# Patient Record
Sex: FEMALE | Race: WHITE | Employment: FULL TIME | URBAN - NONMETROPOLITAN AREA
[De-identification: names, ages, dates, MRNs, and addresses within clinical notes are randomized per-mention and may not be internally consistent; named-entity substitution may affect disease eponyms.]

---

## 2018-03-02 ENCOUNTER — HOSPITAL ENCOUNTER (OUTPATIENT)
Dept: WOMENS IMAGING | Age: 53
Discharge: HOME | End: 2018-03-02
Payer: COMMERCIAL

## 2018-03-02 ENCOUNTER — APPOINTMENT (OUTPATIENT)
Dept: WOMENS IMAGING | Age: 53
End: 2018-03-02
Payer: COMMERCIAL

## 2018-03-02 DIAGNOSIS — N63.0 BREAST MASS: ICD-10-CM

## 2018-03-02 PROCEDURE — 77066 DX MAMMO INCL CAD BI: CPT

## 2018-04-06 ENCOUNTER — HOSPITAL ENCOUNTER (EMERGENCY)
Age: 53
Discharge: HOME | End: 2018-04-06
Payer: COMMERCIAL

## 2018-04-06 VITALS
WEIGHT: 160.4 LBS | HEART RATE: 68 BPM | DIASTOLIC BLOOD PRESSURE: 91 MMHG | TEMPERATURE: 98.6 F | RESPIRATION RATE: 16 BRPM | OXYGEN SATURATION: 97 % | SYSTOLIC BLOOD PRESSURE: 144 MMHG | HEIGHT: 67 IN | BODY MASS INDEX: 25.18 KG/M2

## 2018-04-06 DIAGNOSIS — H10.32 ACUTE BACTERIAL CONJUNCTIVITIS OF LEFT EYE: Primary | ICD-10-CM

## 2018-04-06 PROCEDURE — 99214 OFFICE O/P EST MOD 30 MIN: CPT | Performed by: NURSE PRACTITIONER

## 2018-04-06 PROCEDURE — 99213 OFFICE O/P EST LOW 20 MIN: CPT

## 2018-04-06 RX ORDER — POLYMYXIN B SULFATE AND TRIMETHOPRIM 1; 10000 MG/ML; [USP'U]/ML
1 SOLUTION OPHTHALMIC
Qty: 1 BOTTLE | Refills: 0 | Status: SHIPPED | OUTPATIENT
Start: 2018-04-06 | End: 2018-04-13

## 2018-04-06 ASSESSMENT — ENCOUNTER SYMPTOMS
PHOTOPHOBIA: 0
EYE WATERING: 1
EYE PAIN: 0
BLURRED VISION: 1
NAUSEA: 0
VOMITING: 0
EYE INFLAMMATION: 1
RHINORRHEA: 0
VOICE CHANGE: 0
COUGH: 0
FACIAL SWELLING: 0
PERI-ORBITAL EDEMA: 0
SORE THROAT: 0
EYE REDNESS: 1
EYE DISCHARGE: 0
DOUBLE VISION: 0
EYE ITCHING: 0
CRUSTING: 0

## 2020-01-10 ENCOUNTER — HOSPITAL ENCOUNTER (OUTPATIENT)
Dept: MAMMOGRAPHY | Age: 55
Discharge: HOME | End: 2020-01-10
Payer: COMMERCIAL

## 2020-01-10 PROCEDURE — 77063 BREAST TOMOSYNTHESIS BI: CPT

## 2020-09-25 ENCOUNTER — HOSPITAL ENCOUNTER (EMERGENCY)
Age: 55
Discharge: HOME | End: 2020-09-25
Attending: EMERGENCY MEDICINE
Payer: COMMERCIAL

## 2020-09-25 VITALS
HEIGHT: 66 IN | RESPIRATION RATE: 16 BRPM | DIASTOLIC BLOOD PRESSURE: 65 MMHG | BODY MASS INDEX: 24.91 KG/M2 | HEART RATE: 78 BPM | SYSTOLIC BLOOD PRESSURE: 145 MMHG | WEIGHT: 155 LBS | OXYGEN SATURATION: 98 % | TEMPERATURE: 96.7 F

## 2020-09-25 PROCEDURE — 99213 OFFICE O/P EST LOW 20 MIN: CPT | Performed by: EMERGENCY MEDICINE

## 2020-09-25 PROCEDURE — 99212 OFFICE O/P EST SF 10 MIN: CPT

## 2020-09-25 RX ORDER — PREDNISONE 20 MG/1
20 TABLET ORAL DAILY
Qty: 14 TABLET | Refills: 0 | Status: SHIPPED | OUTPATIENT
Start: 2020-09-25 | End: 2020-10-07

## 2020-09-25 RX ORDER — HYDROXYZINE 50 MG/1
50 TABLET, FILM COATED ORAL 4 TIMES DAILY PRN
Qty: 15 TABLET | Refills: 0 | Status: SHIPPED | OUTPATIENT
Start: 2020-09-25

## 2020-09-25 ASSESSMENT — ENCOUNTER SYMPTOMS
FACIAL SWELLING: 0
BACK PAIN: 0
SORE THROAT: 0
CHOKING: 0
EYE PAIN: 0
VOMITING: 0
BLOOD IN STOOL: 0
DIARRHEA: 0
TROUBLE SWALLOWING: 0
WHEEZING: 0
STRIDOR: 0
ABDOMINAL PAIN: 0
SINUS PRESSURE: 0
COUGH: 0
NAUSEA: 0
CONSTIPATION: 0
EYE REDNESS: 0
EYE DISCHARGE: 0
SHORTNESS OF BREATH: 0
VOICE CHANGE: 0

## 2020-09-25 NOTE — ED PROVIDER NOTES
[phone]       Chief Complaint   Patient presents with    Rash       Nurses Notes reviewed and I agree except as noted in the HPI. HISTORY OF PRESENT ILLNESS   Mariebl Borja is a 49 y.o. female who presents with persistent itchy rash of one-week on both lower extremities after plant allergen exposure in [place]. Patient has completed Medrol Dosepak without improvement. No fever, vomiting, chest pain, shortness of breath, wheezing, dizziness, syncope. No purulent discharge. No painful or swollen glands. No painful areas of rash. REVIEW OF SYSTEMS     Review of Systems   Constitutional: Negative for appetite change, chills, fatigue, fever and unexpected weight change. HENT: Negative for congestion, ear discharge, ear pain, facial swelling, hearing loss, nosebleeds, postnasal drip, sinus pressure, sore throat, trouble swallowing and voice change. Eyes: Negative for pain, discharge, redness and visual disturbance. Respiratory: Negative for cough, choking, shortness of breath, wheezing and stridor. Cardiovascular: Negative for chest pain and leg swelling. Gastrointestinal: Negative for abdominal pain, blood in stool, constipation, diarrhea, nausea and vomiting. Genitourinary: Negative for dysuria, flank pain, frequency, hematuria, urgency, vaginal bleeding and vaginal discharge. Musculoskeletal: Negative for arthralgias, back pain, neck pain and neck stiffness. Skin: Positive for rash. Itchy rash both lower extremities after plant allergen exposure   Neurological: Negative for dizziness, seizures, syncope, weakness, light-headedness and headaches. Hematological: Negative for adenopathy. Does not bruise/bleed easily. Psychiatric/Behavioral: Negative for confusion, sleep disturbance and suicidal ideas. The patient is not nervous/anxious. All other systems reviewed and are negative.       PAST MEDICAL HISTORY         Diagnosis Date    Hypertension        SURGICAL HISTORY     Patient  has a past surgical history that includes  section and Appendectomy. CURRENT MEDICATIONS       Discharge Medication List as of 2020  6:55 PM      CONTINUE these medications which have NOT CHANGED    Details   cyclobenzaprine (FLEXERIL) 10 MG tablet Take 10 mg by mouth nightly as needed for Muscle spasms      docusate sodium (COLACE) 100 MG capsule Take 100 mg by mouth daily [facility] brand      ibuprofen (ADVIL;MOTRIN) 200 MG tablet Take 200 mg by mouth every 6 hours as needed for Pain (for headache)      vitamin D (CHOLECALCIFEROL) 1000 UNIT TABS tablet Take 1,000 Units by mouth daily      acetaminophen 650 MG TABS Take 650 mg by mouth every 4 hours as needed, Disp-120 tablet, R-3      aspirin 81 MG chewable tablet Take 1 tablet by mouth daily, Disp-30 tablet, R-3      omeprazole (PRILOSEC) 10 MG capsule Take 10 mg by mouth daily. Multiple Vitamins-Minerals (THERAPEUTIC MULTIVITAMIN-MINERALS) tablet Take 1 tablet by mouth daily. ALLERGIES     Patient is has No Known Allergies. FAMILY HISTORY     Patient'sfamily history includes Diabetes in her father; Heart Disease in her father and mother; High Blood Pressure in her father and mother. SOCIAL HISTORY     Patient  reports that she has never smoked. She has never used smokeless tobacco. She reports that she does not drink alcohol or use drugs. PHYSICAL EXAM     ED TRIAGE VITALS  BP: (!) 145/65, Temp: 96.7 °F (35.9 °C), Pulse: 78, Resp: 16, SpO2: 98 %  Physical Exam  Vitals signs and nursing note reviewed. Constitutional:       General: She is not in acute distress. Appearance: She is well-developed. She is not ill-appearing. Comments: Moist membranes, normal airway   HENT:      Head: Normocephalic and atraumatic.       Right Ear: Tympanic membrane and external ear normal.      Left Ear: Tympanic membrane and external ear normal. Nose: Nose normal. No congestion or rhinorrhea. Comments: Oropharynx normal     Mouth/Throat:      Pharynx: No oropharyngeal exudate. Eyes:      General: No scleral icterus. Right eye: No discharge. Left eye: No discharge. Extraocular Movements:      Right eye: Normal extraocular motion. Left eye: Normal extraocular motion. Conjunctiva/sclera: Conjunctivae normal.      Pupils: Pupils are equal, round, and reactive to light. Comments: Conjunctiva clear   Neck:      Musculoskeletal: Normal range of motion. Thyroid: No thyromegaly. Vascular: No JVD. Comments: No meningismus  Cardiovascular:      Rate and Rhythm: Normal rate and regular rhythm. Pulses: Normal pulses. Heart sounds: Normal heart sounds, S1 normal and S2 normal. No murmur. No friction rub. No gallop. Pulmonary:      Effort: Pulmonary effort is normal. No tachypnea or respiratory distress. Breath sounds: Normal breath sounds. No stridor. No decreased breath sounds, wheezing, rhonchi or rales. Comments: No cough, lungs clear  Chest:      Chest wall: No tenderness. Abdominal:      General: Bowel sounds are normal. There is no distension. Palpations: Abdomen is soft. There is no mass. Tenderness: There is no abdominal tenderness. There is no right CVA tenderness, left CVA tenderness, guarding or rebound. Comments: Soft nontender   Musculoskeletal: Normal range of motion. General: No tenderness. Right lower leg: Normal.      Left lower leg: Normal.   Lymphadenopathy:      Cervical: No cervical adenopathy. Right cervical: No superficial cervical adenopathy. Left cervical: No superficial cervical adenopathy. Skin:     General: Skin is warm and dry. Findings: No erythema or rash. Comments: Allergic contact dermatitis both lower legs. No bacterial infection. No infestation.    Neurological:      Mental Status: She is alert and oriented to person, place, and time. Cranial Nerves: No cranial nerve deficit. Motor: No abnormal muscle tone. Coordination: Coordination normal.      Deep Tendon Reflexes: Reflexes are normal and symmetric. Reflexes normal.      Comments: Appropriate, no focal finding   Psychiatric:         Behavior: Behavior normal.         Thought Content: Thought content normal.         Judgment: Judgment normal.         DIAGNOSTIC RESULTS   Labs: No results found for this visit on 09/25/20. IMAGING:  No orders to display     URGENT CARE COURSE:     Vitals:    09/25/20 1821   BP: (!) 145/65   Pulse: 78   Resp: 16   Temp: 96.7 °F (35.9 °C)   TempSrc: Infrared   SpO2: 98%   Weight: 155 lb (70.3 kg)   Height: 5' 6\" (1.676 m)       Medications - No data to display  PROCEDURES:  None  FINALIMPRESSION      1. Allergic contact dermatitis due to plant        DISPOSITION/PLAN   DISPOSITION    Nontoxic, well-hydrated, normal airway. No anaphylactic or anaphylactoid reaction, angioedema, urticaria, SJS, TEN. Patient has allergic contact dermatitis. No infestation. No bacterial infection. Will require systemic and topical corticosteroids and antihistamines. Will treat with prednisone, Valisone, Atarax, increased oral clear liquids, rest in cool air conditioned space.   Patient to recheck with PCP in 7 days if problems persist, and she understands to go to ED if worse  PATIENT REFERRED TO:  DO Filomena Hall [facility] [address]  [address] [place] [address]    Schedule an appointment as soon as possible for a visit in 7 days  Recheck if problems persist, go to emergency if worse    DISCHARGE MEDICATIONS:  Discharge Medication List as of 9/25/2020  6:55 PM      START taking these medications    Details   predniSONE (DELTASONE) 20 MG tablet Take 1 tablet by mouth daily for 12 days 2 p.o. daily for 4 days, 1 p.o. daily for 4 days, one half p.o. daily for 4 days, Disp-14 tablet,R-0Print      betamethasone valerate (VALISONE) 0.1 % cream Apply topically 3 times daily.   Do not apply to face, Disp-45 g,R-0, Print      hydrOXYzine (ATARAX) 50 MG tablet Take 1 tablet by mouth 4 times daily as needed for Itching (rash swelling) Caution will cause drowsiness do not take at work, Disp-15 tablet,R-0Print           Discharge Medication List as of 9/25/2020  6:55 PM          MD Mason Bolton MD  09/25/20 15 Merry Graham MD  09/25/20 4947

## 2021-01-15 ENCOUNTER — HOSPITAL ENCOUNTER (OUTPATIENT)
Dept: MAMMOGRAPHY | Age: 56
Discharge: HOME | End: 2021-01-15
Payer: COMMERCIAL

## 2021-01-15 DIAGNOSIS — Z12.31 ENCOUNTER FOR SCREENING MAMMOGRAM FOR MALIGNANT NEOPLASM OF BREAST: ICD-10-CM

## 2021-01-15 PROCEDURE — 77063 BREAST TOMOSYNTHESIS BI: CPT

## 2021-09-24 ENCOUNTER — HOSPITAL ENCOUNTER (EMERGENCY)
Age: 56
Discharge: HOME | End: 2021-09-24
Payer: COMMERCIAL

## 2021-09-24 VITALS
SYSTOLIC BLOOD PRESSURE: 151 MMHG | OXYGEN SATURATION: 98 % | WEIGHT: 150 LBS | BODY MASS INDEX: 23.54 KG/M2 | RESPIRATION RATE: 16 BRPM | TEMPERATURE: 99 F | HEART RATE: 96 BPM | HEIGHT: 67 IN | DIASTOLIC BLOOD PRESSURE: 86 MMHG

## 2021-09-24 DIAGNOSIS — J02.9 ACUTE PHARYNGITIS, UNSPECIFIED ETIOLOGY: ICD-10-CM

## 2021-09-24 DIAGNOSIS — H65.01 RIGHT ACUTE SEROUS OTITIS MEDIA, RECURRENCE NOT SPECIFIED: Primary | ICD-10-CM

## 2021-09-24 PROCEDURE — 99213 OFFICE O/P EST LOW 20 MIN: CPT

## 2021-09-24 PROCEDURE — 99213 OFFICE O/P EST LOW 20 MIN: CPT | Performed by: EMERGENCY MEDICINE

## 2021-09-24 RX ORDER — AMOXICILLIN 875 MG/1
875 TABLET, COATED ORAL 2 TIMES DAILY
Qty: 20 TABLET | Refills: 0 | Status: SHIPPED | OUTPATIENT
Start: 2021-09-24 | End: 2021-10-04

## 2021-09-24 ASSESSMENT — PAIN DESCRIPTION - LOCATION: LOCATION: EAR;THROAT

## 2021-09-24 ASSESSMENT — PAIN DESCRIPTION - FREQUENCY: FREQUENCY: CONTINUOUS

## 2021-09-24 ASSESSMENT — ENCOUNTER SYMPTOMS
RHINORRHEA: 0
SINUS PAIN: 0
SORE THROAT: 1
SHORTNESS OF BREATH: 0
TROUBLE SWALLOWING: 1
COUGH: 0

## 2021-09-24 ASSESSMENT — PAIN DESCRIPTION - PAIN TYPE: TYPE: ACUTE PAIN

## 2021-09-24 ASSESSMENT — PAIN SCALES - GENERAL: PAINLEVEL_OUTOF10: 6

## 2021-09-24 ASSESSMENT — PAIN DESCRIPTION - DESCRIPTORS: DESCRIPTORS: ACHING;SORE

## 2021-09-24 NOTE — ED TRIAGE NOTES
Pt ambulatory into esuc with c/o bilateral ear pain, headache and sore throat for the past two days. Pt states pain 6 and is hard to swallow.

## 2021-09-24 NOTE — ED PROVIDER NOTES
[facility] 36  Urgent Care Encounter       CHIEF COMPLAINT       Chief Complaint   Patient presents with    Pharyngitis    Headache    Otalgia       Nurses Notes reviewed and I agree except as noted in the HPI. HISTORY OF PRESENT ILLNESS   Stella Rodriguez is a 48 y.o. female who presents for complaints of bilateral ear pain, sore throat. Symptoms x3 days. Patient states the ears started first.  Then she developed a sore throat. Now she has swollen glands. No known fever but she has not taken her temperature. Currently she rates her pain 6 on a 10 scale. No cough. No shortness of breath. No loss of sensation of taste or smell. No sinus congestion or rhinorrhea. HPI    REVIEW OF SYSTEMS     Review of Systems   Constitutional: Negative for diaphoresis, fatigue and fever. HENT: Positive for ear pain, sore throat and trouble swallowing (pain with swallowing but no difficulty swallowing). Negative for congestion, ear discharge, rhinorrhea and sinus pain. Respiratory: Negative for cough and shortness of breath. Cardiovascular: Negative for chest pain. Neurological: Negative for dizziness, light-headedness and headaches. PAST MEDICAL HISTORY         Diagnosis Date    Hypertension        SURGICALHISTORY     Patient  has a past surgical history that includes  section and Appendectomy. CURRENT MEDICATIONS       Discharge Medication List as of 2021  3:17 PM      CONTINUE these medications which have NOT CHANGED    Details   docusate sodium (COLACE) 100 MG capsule Take 100 mg by mouth daily [facility] brand      vitamin D (CHOLECALCIFEROL) 1000 UNIT TABS tablet Take 1,000 Units by mouth daily      acetaminophen 650 MG TABS Take 650 mg by mouth every 4 hours as needed, Disp-120 tablet, R-3      aspirin 81 MG chewable tablet Take 1 tablet by mouth daily, Disp-30 tablet, R-3      omeprazole (PRILOSEC) 10 MG capsule Take 10 mg by mouth daily. Multiple Vitamins-Minerals (THERAPEUTIC MULTIVITAMIN-MINERALS) tablet Take 1 tablet by mouth daily. hydrOXYzine (ATARAX) 50 MG tablet Take 1 tablet by mouth 4 times daily as needed for Itching (rash swelling) Caution will cause drowsiness do not take at work, Disp-15 tablet,R-0Print      cyclobenzaprine (FLEXERIL) 10 MG tablet Take 10 mg by mouth nightly as needed for Muscle spasms      ibuprofen (ADVIL;MOTRIN) 200 MG tablet Take 200 mg by mouth every 6 hours as needed for Pain (for headache)             ALLERGIES     Patient is has No Known Allergies. Patients   There is no immunization history on file for this patient. FAMILY HISTORY     Patient's family history includes Diabetes in her father; Heart Disease in her father and mother; High Blood Pressure in her father and mother. SOCIAL HISTORY     Patient  reports that she has never smoked. She has never used smokeless tobacco. She reports that she does not drink alcohol and does not use drugs. PHYSICAL EXAM     ED TRIAGE VITALS  BP: (!) 151/86, Temp: 99 °F (37.2 °C), Pulse: 96, Resp: 16, SpO2: 98 %,Estimated body mass index is 23.85 kg/m² as calculated from the following:    Height as of this encounter: 5' 6.5\" (1.689 m). Weight as of this encounter: 150 lb (68 kg). ,Patient's last menstrual period was 03/04/2018. Physical Exam  Vitals reviewed. Constitutional:       Appearance: She is well-developed and normal weight. HENT:      Head: Normocephalic. Right Ear: Swelling and tenderness present. No drainage. A middle ear effusion is present. Tympanic membrane is erythematous. Left Ear: Swelling present. No drainage or tenderness. No middle ear effusion. Tympanic membrane is not erythematous. Nose: No congestion or rhinorrhea. Mouth/Throat:      Mouth: Mucous membranes are moist.      Pharynx: Posterior oropharyngeal erythema present. No pharyngeal swelling, oropharyngeal exudate or uvula swelling.       Tonsils: No tonsillar exudate or tonsillar abscesses. Cardiovascular:      Rate and Rhythm: Normal rate. Heart sounds: Normal heart sounds. Abdominal:      General: Bowel sounds are normal.      Palpations: Abdomen is soft. Lymphadenopathy:      Cervical: Cervical adenopathy present. Skin:     General: Skin is warm and dry. Capillary Refill: Capillary refill takes less than 2 seconds. Neurological:      General: No focal deficit present. Mental Status: She is alert. Psychiatric:         Mood and Affect: Mood normal.         DIAGNOSTIC RESULTS     Labs:No results found for this visit on 09/24/21. IMAGING:    No orders to display         EKG:      URGENT CARE COURSE:     Vitals:    09/24/21 1504   BP: (!) 151/86   Pulse: 96   Resp: 16   Temp: 99 °F (37.2 °C)   TempSrc: Temporal   SpO2: 98%   Weight: 150 lb (68 kg)   Height: 5' 6.5\" (1.689 m)       Medications - No data to display         PROCEDURES:  None    FINAL IMPRESSION      1. Right acute serous otitis media, recurrence not specified    2. Acute pharyngitis, unspecified etiology          DISPOSITION/ PLAN     Patient presents for what is likely right otitis media. Patient also has sore throat, possible strep. I elected not to test for strep as there is shortage on strep test availability. Will cover OM with amoxicillin which will also cover strep. Patient will be discharged. Advise drink plenty fluids. Chloraseptic spray or lozenges for sore throat. Tylenol or Motrin for ear pain. Return for new or worsening symptoms. Patient verbalized understanding of all instructions.       PATIENT REFERRED TO:  DO Filomena Sanchez [facility] [address] / [place] [facility] [id number]      DISCHARGE MEDICATIONS:  Discharge Medication List as of 9/24/2021  3:17 PM      START taking these medications    Details   amoxicillin (AMOXIL) 875 MG tablet Take 1 tablet by mouth 2 times daily for 10 days, Disp-20 tablet, R-0Normal             Discharge Medication List as of 9/24/2021  3:17 PM          Discharge Medication List as of 9/24/2021  3:17 PM          PRESTON Mike CNP    (Please note that portions of this note were completed with a voice recognition program. Efforts were made to edit the dictations but occasionally words are mis-transcribed.)          PRESTON Mike CNP  09/24/21 1521

## 2022-01-14 ENCOUNTER — NURSE ONLY (OUTPATIENT)
Dept: LAB | Age: 57
End: 2022-01-14

## 2022-01-14 ENCOUNTER — HOSPITAL ENCOUNTER (OUTPATIENT)
Dept: WOMENS IMAGING | Age: 57
Discharge: HOME | End: 2022-01-14
Payer: COMMERCIAL

## 2022-01-14 DIAGNOSIS — Z12.31 VISIT FOR SCREENING MAMMOGRAM: ICD-10-CM

## 2022-01-14 PROCEDURE — 77063 BREAST TOMOSYNTHESIS BI: CPT

## 2022-01-25 LAB — CYTOLOGY THIN PREP PAP: NORMAL

## 2022-05-30 ENCOUNTER — HOSPITAL ENCOUNTER (EMERGENCY)
Age: 57
Discharge: HOME | End: 2022-05-30
Payer: COMMERCIAL

## 2022-05-30 VITALS
DIASTOLIC BLOOD PRESSURE: 84 MMHG | BODY MASS INDEX: 27 KG/M2 | WEIGHT: 168 LBS | HEART RATE: 85 BPM | RESPIRATION RATE: 16 BRPM | SYSTOLIC BLOOD PRESSURE: 138 MMHG | TEMPERATURE: 99.5 F | OXYGEN SATURATION: 98 % | HEIGHT: 66 IN

## 2022-05-30 DIAGNOSIS — J06.9 VIRAL URI: ICD-10-CM

## 2022-05-30 DIAGNOSIS — H65.01 NON-RECURRENT ACUTE SEROUS OTITIS MEDIA OF RIGHT EAR: Primary | ICD-10-CM

## 2022-05-30 PROCEDURE — 99213 OFFICE O/P EST LOW 20 MIN: CPT

## 2022-05-30 PROCEDURE — 99213 OFFICE O/P EST LOW 20 MIN: CPT | Performed by: NURSE PRACTITIONER

## 2022-05-30 RX ORDER — AMOXICILLIN AND CLAVULANATE POTASSIUM 875; 125 MG/1; MG/1
1 TABLET, FILM COATED ORAL 2 TIMES DAILY
Qty: 20 TABLET | Refills: 0 | Status: SHIPPED | OUTPATIENT
Start: 2022-05-30 | End: 2022-06-09

## 2022-05-30 RX ORDER — AMLODIPINE BESYLATE 5 MG/1
5 TABLET ORAL DAILY
COMMUNITY

## 2022-05-30 ASSESSMENT — ENCOUNTER SYMPTOMS
EYE DISCHARGE: 0
COUGH: 1
SORE THROAT: 0
TROUBLE SWALLOWING: 0
NAUSEA: 0
SINUS PRESSURE: 1
DIARRHEA: 0
EYE REDNESS: 0
VOMITING: 0
CHEST TIGHTNESS: 0
FACIAL SWELLING: 0
SHORTNESS OF BREATH: 0
RHINORRHEA: 1
SINUS PAIN: 1
VOICE CHANGE: 1
WHEEZING: 0

## 2022-05-30 ASSESSMENT — PAIN - FUNCTIONAL ASSESSMENT: PAIN_FUNCTIONAL_ASSESSMENT: NONE - DENIES PAIN

## 2022-05-30 NOTE — ED PROVIDER NOTES
[address] [facility] [phone]       Chief Complaint   Patient presents with    Headache    Otalgia     right    Laryngitis    Cough       Nurses Notes reviewed and I agree except as noted in the HPI. HISTORY OF PRESENT ILLNESS   Brijesh Matthews is a 55 y.o. female who presents for evaluation of cough, ear, sinus problems. Onset between 2 and 3 days ago, worsening. Cough is intermittent, productive at times. Associated sinus congestion, pressure, headache. She also notes right ear pain, no otorrhea. No fever, chest pain, shortness of breath. No travel. No exposure to strep, COVID, flu. No improvement with Sudafed. No additional complaints. REVIEW OF SYSTEMS     Review of Systems   Constitutional: Positive for fatigue. Negative for chills, diaphoresis and fever. HENT: Positive for congestion, ear pain, postnasal drip, rhinorrhea, sinus pressure, sinus pain and voice change. Negative for ear discharge, facial swelling, hearing loss, sore throat and trouble swallowing. Eyes: Negative for discharge and redness. Respiratory: Positive for cough. Negative for chest tightness, shortness of breath and wheezing. Cardiovascular: Negative for chest pain. Gastrointestinal: Negative for diarrhea, nausea and vomiting. Genitourinary: Negative for decreased urine volume. Musculoskeletal: Negative for neck pain and neck stiffness. Skin: Negative for rash. Neurological: Positive for headaches. Negative for dizziness. Hematological: Negative for adenopathy. Psychiatric/Behavioral: Negative for sleep disturbance. PAST MEDICAL HISTORY         Diagnosis Date    Hypertension        SURGICAL HISTORY     Patient  has a past surgical history that includes  section and Appendectomy.     CURRENT MEDICATIONS       Discharge Medication List as of 2022 10:28 AM      CONTINUE these medications which have NOT CHANGED    Details amLODIPine (NORVASC) 5 MG tablet Take 5 mg by mouth dailyHistorical Med      hydrOXYzine (ATARAX) 50 MG tablet Take 1 tablet by mouth 4 times daily as needed for Itching (rash swelling) Caution will cause drowsiness do not take at work, Disp-15 tablet,R-0Print      cyclobenzaprine (FLEXERIL) 10 MG tablet Take 10 mg by mouth nightly as needed for Muscle spasms      docusate sodium (COLACE) 100 MG capsule Take 100 mg by mouth daily [facility] brand      ibuprofen (ADVIL;MOTRIN) 200 MG tablet Take 200 mg by mouth every 6 hours as needed for Pain (for headache)      vitamin D (CHOLECALCIFEROL) 1000 UNIT TABS tablet Take 1,000 Units by mouth daily      acetaminophen 650 MG TABS Take 650 mg by mouth every 4 hours as needed, Disp-120 tablet, R-3      aspirin 81 MG chewable tablet Take 1 tablet by mouth daily, Disp-30 tablet, R-3      omeprazole (PRILOSEC) 10 MG capsule Take 10 mg by mouth daily. Multiple Vitamins-Minerals (THERAPEUTIC MULTIVITAMIN-MINERALS) tablet Take 1 tablet by mouth daily. ALLERGIES     Patient is has No Known Allergies. FAMILY HISTORY     Patient'sfamily history includes Diabetes in her father; Heart Disease in her father and mother; High Blood Pressure in her father and mother. SOCIAL HISTORY     Patient  reports that she has never smoked. She has never used smokeless tobacco. She reports that she does not drink alcohol and does not use drugs. PHYSICAL EXAM     ED TRIAGE VITALS  BP: 138/84, Temp: 99.5 °F (37.5 °C), Heart Rate: 85, Resp: 16, SpO2: 98 %  Physical Exam  Vitals and nursing note reviewed. Constitutional:       General: She is not in acute distress. Appearance: Normal appearance. She is well-developed. She is not ill-appearing, toxic-appearing or diaphoretic. HENT:      Head: Normocephalic and atraumatic. Right Ear: Hearing, ear canal and external ear normal. No drainage, swelling or tenderness.  A middle ear effusion (cloudy with loss of landmarks) is present. No mastoid tenderness. No hemotympanum. Tympanic membrane is bulging. Tympanic membrane is not perforated or erythematous. Left Ear: Hearing, ear canal and external ear normal. No drainage, swelling or tenderness. A middle ear effusion is present. No mastoid tenderness. No hemotympanum. Tympanic membrane is not perforated, erythematous or bulging. Nose: Nose normal.      Mouth/Throat:      Mouth: Mucous membranes are moist.      Pharynx: Oropharynx is clear. Uvula midline. Tonsils: No tonsillar abscesses. Eyes:      General: No scleral icterus. Conjunctiva/sclera: Conjunctivae normal.      Right eye: Right conjunctiva is not injected. No hemorrhage. Left eye: Left conjunctiva is not injected. No hemorrhage. Neck:      Thyroid: No thyromegaly. Trachea: Trachea normal.   Cardiovascular:      Rate and Rhythm: Normal rate and regular rhythm. No extrasystoles are present. Chest Wall: PMI is not displaced. Heart sounds: Normal heart sounds. No murmur heard. No friction rub. No gallop. Pulmonary:      Effort: Pulmonary effort is normal. No respiratory distress. Breath sounds: Normal breath sounds. Chest:   Breasts:      Right: No supraclavicular adenopathy. Left: No supraclavicular adenopathy. Musculoskeletal:      Cervical back: Normal range of motion and neck supple. Lymphadenopathy:      Head:      Right side of head: No submental, submandibular, tonsillar or occipital adenopathy. Left side of head: No submental, submandibular, tonsillar or occipital adenopathy. Cervical: No cervical adenopathy. Upper Body:      Right upper body: No supraclavicular adenopathy. Left upper body: No supraclavicular adenopathy. Skin:     General: Skin is warm and dry. Capillary Refill: Capillary refill takes less than 2 seconds. Coloration: Skin is not pale. Findings: No rash.       Comments: Skin warm and dry to touch, no rashes noted on exposed surfaces. Neurological:      Mental Status: She is alert and oriented to person, place, and time. She is not disoriented. Psychiatric:         Mood and Affect: Mood normal.         Behavior: Behavior is cooperative. DIAGNOSTIC RESULTS   Labs: No results found for this visit on 05/30/22. IMAGING:  No orders to display     URGENT CARE COURSE:     Vitals:    05/30/22 1009   BP: 138/84   Pulse: 85   Resp: 16   Temp: 99.5 °F (37.5 °C)   TempSrc: Temporal   SpO2: 98%   Weight: 168 lb (76.2 kg)   Height: 5' 6\" (1.676 m)       Medications - No data to display  PROCEDURES:  None  FINALIMPRESSION      1. Non-recurrent acute serous otitis media of right ear    2. Viral URI        DISPOSITION/PLAN   DISPOSITION Decision To Discharge 05/30/2022 10:27:05 AM  Nontoxic, no distress. Exam consistent with serous otitis media, TM intact. No otitis externa. Medication as prescribed. Recommended daily yogurt/probiotic. Advised to stop Sudafed. Treat congestion locally with vapor inhaler nasal decongestant. Sinus rinse twice daily. If symptoms worsen go to ER. PATIENT REFERRED TO:  Bon Hinton, [address]      Follow-up as needed. Medication as prescribed. Daily yogurt/probiotic. Sinus rinse twice daily. Vapor inhaler nasal decongestant over-the-counter. If symptoms worsen return or go to ER.     DISCHARGE MEDICATIONS:  Discharge Medication List as of 5/30/2022 10:28 AM      START taking these medications    Details   amoxicillin-clavulanate (AUGMENTIN) 875-125 MG per tablet Take 1 tablet by mouth 2 times daily for 10 days, Disp-20 tablet, R-0Print           Discharge Medication List as of 5/30/2022 10:28 AM          PRESTON Coleman CNP, APRN - CNP  05/30/22 0666

## 2022-05-30 NOTE — ED NOTES
To [facility] with complaints of possible sinus infection.  Started Saturday at [address] of headache, right ear pain, laryngitis, cough, nasal congestion     Sia Clark RN  05/30/22 1012

## 2022-12-05 ENCOUNTER — HOSPITAL ENCOUNTER (EMERGENCY)
Age: 57
Discharge: HOME | End: 2022-12-05
Payer: COMMERCIAL

## 2022-12-05 DIAGNOSIS — J01.10 ACUTE FRONTAL SINUSITIS, RECURRENCE NOT SPECIFIED: Primary | ICD-10-CM

## 2022-12-05 PROCEDURE — 99213 OFFICE O/P EST LOW 20 MIN: CPT | Performed by: NURSE PRACTITIONER

## 2022-12-05 PROCEDURE — 99213 OFFICE O/P EST LOW 20 MIN: CPT

## 2022-12-05 RX ORDER — DOXYCYCLINE HYCLATE 100 MG
100 TABLET ORAL 2 TIMES DAILY
Qty: 14 TABLET | Refills: 0 | Status: SHIPPED | OUTPATIENT
Start: 2022-12-05 | End: 2022-12-12

## 2022-12-05 RX ORDER — DEXTROMETHORPHAN DOXYLAMINE SUCCINATE 7.5; 3.125 MG/5ML; MG/5ML
LIQUID ORAL
COMMUNITY

## 2022-12-05 RX ORDER — LISINOPRIL 10 MG/1
10 TABLET ORAL DAILY
COMMUNITY

## 2022-12-05 ASSESSMENT — ENCOUNTER SYMPTOMS
NAUSEA: 0
RHINORRHEA: 1
ABDOMINAL PAIN: 0
VOMITING: 0
SINUS PAIN: 1
SORE THROAT: 0
EYE REDNESS: 0
SHORTNESS OF BREATH: 0
DIARRHEA: 0
EYE ITCHING: 0
CHEST TIGHTNESS: 0
COUGH: 0
SINUS PRESSURE: 1

## 2022-12-05 NOTE — ED PROVIDER NOTES
[id number]       Chief Complaint   Patient presents with    Cough    Head Congestion              Nurses Notes reviewed and I agree except as noted in the HPI. HISTORY OF PRESENT ILLNESS   Jordan Huff is a 55 y.o. female who presents to the urgent care for evaluation of a sinus infection that worsened Friday/Saturday. Reports a lot of congestion, nose blowing, and runny nose. Has cough. Over-the-counter medications not improving symptoms. The patient/patient representative has no other acute complaints at this time. REVIEW OF SYSTEMS     Review of Systems   Constitutional:  Negative for chills, fatigue and fever. HENT:  Positive for congestion, postnasal drip, rhinorrhea, sinus pressure and sinus pain. Negative for ear pain and sore throat. Eyes:  Negative for redness and itching. Respiratory:  Negative for cough, chest tightness and shortness of breath. Cardiovascular:  Negative for chest pain. Gastrointestinal:  Negative for abdominal pain, diarrhea, nausea and vomiting. Skin:  Negative for rash. Allergic/Immunologic: Negative for environmental allergies and food allergies. Neurological:  Negative for headaches. Hematological:  Negative for adenopathy. PAST MEDICAL HISTORY         Diagnosis Date    Hypertension        SURGICAL HISTORY     Patient  has a past surgical history that includes  section and Appendectomy.     CURRENT MEDICATIONS       Previous Medications    ACETAMINOPHEN 650 MG TABS    Take 650 mg by mouth every 4 hours as needed    ASPIRIN 81 MG CHEWABLE TABLET    Take 1 tablet by mouth daily    CYCLOBENZAPRINE (FLEXERIL) 10 MG TABLET    Take 10 mg by mouth nightly as needed for Muscle spasms    DOCUSATE SODIUM (COLACE) 100 MG CAPSULE    Take 100 mg by mouth daily [facility] brand    DOXYLAMINE-DM (SAFE TUSSIN PM) 3.125-7.5 MG/5ML LIQD    Take by mouth    IBUPROFEN (ADVIL;MOTRIN) 200 MG TABLET    Take 200 mg by mouth every 6 hours as needed for Pain (for headache)    LISINOPRIL (PRINIVIL;ZESTRIL) 10 MG TABLET    Take 10 mg by mouth daily    MULTIPLE VITAMINS-MINERALS (THERAPEUTIC MULTIVITAMIN-MINERALS) TABLET    Take 1 tablet by mouth daily. OMEPRAZOLE (PRILOSEC) 10 MG CAPSULE    Take 10 mg by mouth daily. PHENYLEPHRINE-ACETAMINOPHEN 5-325 MG TABS    Take by mouth    VITAMIN D (CHOLECALCIFEROL) 1000 UNIT TABS TABLET    Take 1,000 Units by mouth daily       ALLERGIES     Patient is has No Known Allergies. FAMILY HISTORY     Patient'sfamily history includes Diabetes in her father; Heart Disease in her father and mother; High Blood Pressure in her father and mother. SOCIAL HISTORY     Patient  reports that she has never smoked. She has never used smokeless tobacco. She reports that she does not drink alcohol and does not use drugs. PHYSICAL EXAM     ED Irena Dus   ,  ,  ,  ,    Physical Exam  Vitals and nursing note reviewed. Constitutional:       General: She is not in acute distress. Appearance: Normal appearance. She is well-developed and well-groomed. HENT:      Head: Normocephalic and atraumatic. Right Ear: Ear canal and external ear normal. A middle ear effusion is present. Left Ear: Ear canal and external ear normal. A middle ear effusion is present. Nose: Mucosal edema and congestion present. Right Sinus: Frontal sinus tenderness present. Left Sinus: Frontal sinus tenderness present. Mouth/Throat:      Lips: Pink. Mouth: Mucous membranes are moist.   Eyes:      Conjunctiva/sclera: Conjunctivae normal.      Right eye: Right conjunctiva is not injected. Left eye: Left conjunctiva is not injected. Pupils: Pupils are equal.   Cardiovascular:      Rate and Rhythm: Normal rate. Heart sounds: Normal heart sounds. Pulmonary:      Effort: Pulmonary effort is normal. No respiratory distress.       Breath sounds: Normal breath sounds. Musculoskeletal:      Cervical back: Normal range of motion. Skin:     General: Skin is warm and dry. Findings: No rash (on exposed surfaces). Neurological:      Mental Status: She is alert and oriented to person, place, and time. Gait: Gait is intact. Psychiatric:         Mood and Affect: Mood normal.         Speech: Speech normal.         Behavior: Behavior is cooperative. DIAGNOSTIC RESULTS   Labs:  Abnormal Labs Reviewed - No data to display     IMAGING:  No orders to display     URGENT CARE COURSE:   There were no vitals filed for this visit. Medications - No data to display  PROCEDURES:  FINALIMPRESSION      1. Acute frontal sinusitis, recurrence not specified        DISPOSITION/PLAN   DISPOSITION Decision To Discharge 12/05/2022 10:27:13 AM             Problem List Items Addressed This Visit    None  Visit Diagnoses       Acute frontal sinusitis, recurrence not specified    -  Primary    Relevant Medications    Doxylamine-DM (SAFE TUSSIN PM) 3.125-7.5 MG/5ML LIQD    Phenylephrine-Acetaminophen 5-325 MG TABS    doxycycline hyclate (VIBRA-TABS) 100 MG tablet            Physical assessment findings, diagnostic testing(s) if applicable, and vital signs reviewed with patient/patient representative. Differential diagnosis(s) discussed with patient/patient representative. Prescription medications and/or over-the-counter medications for symptom management discussed. Patient is to follow-up with family care provider in 2-3 days if no improvement. If symptoms should worsen or change, go to the ED. Patient/patient representative is aware of care plan, questions answered, verbalizes understanding and is in agreement. Printed instructions attached to after visit summary. If COVID-19 positive or COVID-19 by PCR is pending at time of discharge patient is to quarantine/isolate according to [facility] guidelines.         PATIENT REFERRED TO:  DO Fanny Fortune [facility] [address] [place] [address]  [phone]    Schedule an appointment as soon as possible for a visit in 3 days  For further evaluation. , If symptoms change/worsen, go to the [facility], APRN - CNP    Please note that some or all of this chart was generated using [employer] voice recognition software. Although every effort was made to ensure the accuracy of this automated transcription, some errors in transcription may have occurred.         PRESTON Sotelo - CNP  12/05/22 1030

## 2022-12-05 NOTE — LETTER
[facility]  [address]  [address]  Phone: [phone]               December 5, 2022    Patient: Jaylen Cherry   YOB: 1965   Date of Visit: 12/5/2022       To Whom It May Concern:    Jaylen Cherry was seen and treated in our emergency department on 12/5/2022. She may return to work on 12/6/22.         Signature:___Electronically signed by PRESTON Dumont CNP on 12/5/2022 at 10:34 AM  _______________________________

## 2023-02-03 ENCOUNTER — HOSPITAL ENCOUNTER (OUTPATIENT)
Dept: WOMENS IMAGING | Age: 58
Discharge: HOME | End: 2023-02-03
Payer: COMMERCIAL

## 2023-02-03 DIAGNOSIS — Z12.31 VISIT FOR SCREENING MAMMOGRAM: ICD-10-CM

## 2023-02-03 PROCEDURE — 77063 BREAST TOMOSYNTHESIS BI: CPT

## 2024-02-09 ENCOUNTER — HOSPITAL ENCOUNTER (OUTPATIENT)
Dept: WOMENS IMAGING | Age: 59
Discharge: HOME | End: 2024-02-09
Payer: COMMERCIAL

## 2024-02-09 VITALS — BODY MASS INDEX: 29.09 KG/M2 | HEIGHT: 66 IN | WEIGHT: 181 LBS

## 2024-02-09 DIAGNOSIS — Z12.31 VISIT FOR SCREENING MAMMOGRAM: ICD-10-CM

## 2024-02-09 PROCEDURE — 77063 BREAST TOMOSYNTHESIS BI: CPT

## 2025-02-19 ENCOUNTER — LAB (OUTPATIENT)
Dept: LAB | Age: 60
End: 2025-02-19

## 2025-02-19 ENCOUNTER — HOSPITAL ENCOUNTER (OUTPATIENT)
Dept: WOMENS IMAGING | Age: 60
Discharge: HOME | End: 2025-02-19
Payer: COMMERCIAL

## 2025-02-19 DIAGNOSIS — Z12.31 VISIT FOR SCREENING MAMMOGRAM: ICD-10-CM

## 2025-02-19 DIAGNOSIS — Z12.31 ENCOUNTER FOR SCREENING MAMMOGRAM FOR MALIGNANT NEOPLASM OF BREAST: ICD-10-CM

## 2025-02-19 PROCEDURE — 77063 BREAST TOMOSYNTHESIS BI: CPT

## 2025-02-25 LAB — CYTOLOGY THIN PREP PAP: NORMAL
